# Patient Record
Sex: MALE | Race: WHITE | Employment: UNEMPLOYED | URBAN - METROPOLITAN AREA
[De-identification: names, ages, dates, MRNs, and addresses within clinical notes are randomized per-mention and may not be internally consistent; named-entity substitution may affect disease eponyms.]

---
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
## 2022-10-14 ENCOUNTER — TELEPHONE (OUTPATIENT)
Dept: FAMILY MEDICINE CLINIC | Age: 22
End: 2022-10-14

## 2022-10-14 NOTE — TELEPHONE ENCOUNTER
Received call from [facility] inquiring as to why the pt was turned away from his appt today. Judith Disla spoke with them and relayed the information that she gave to caregiver that accompanied the pt today that there was no r/s appt after the cancelled appt with Dr. Corrinne Seeds. Also advised that the  would be consulted with.

## 2022-10-31 ENCOUNTER — OFFICE VISIT (OUTPATIENT)
Dept: FAMILY MEDICINE CLINIC | Age: 22
End: 2022-10-31
Payer: MEDICAID

## 2022-10-31 VITALS
OXYGEN SATURATION: 95 % | TEMPERATURE: 98.1 F | DIASTOLIC BLOOD PRESSURE: 76 MMHG | RESPIRATION RATE: 20 BRPM | HEART RATE: 100 BPM | WEIGHT: 185.8 LBS | BODY MASS INDEX: 29.1 KG/M2 | SYSTOLIC BLOOD PRESSURE: 114 MMHG

## 2022-10-31 DIAGNOSIS — Z00.00 ENCOUNTER FOR MEDICAL EXAMINATION TO ESTABLISH CARE: ICD-10-CM

## 2022-10-31 DIAGNOSIS — F31.9 BIPOLAR 1 DISORDER (HCC): ICD-10-CM

## 2022-10-31 DIAGNOSIS — J30.1 SEASONAL ALLERGIC RHINITIS DUE TO POLLEN: ICD-10-CM

## 2022-10-31 DIAGNOSIS — J45.20 MILD INTERMITTENT ASTHMA WITHOUT COMPLICATION: Primary | ICD-10-CM

## 2022-10-31 DIAGNOSIS — L30.8 OTHER ECZEMA: ICD-10-CM

## 2022-10-31 DIAGNOSIS — F84.0 AUTISM SPECTRUM DISORDER: ICD-10-CM

## 2022-10-31 DIAGNOSIS — Z91.018 FOOD ALLERGY: ICD-10-CM

## 2022-10-31 DIAGNOSIS — F41.9 ANXIETY: ICD-10-CM

## 2022-10-31 DIAGNOSIS — F33.41 RECURRENT MAJOR DEPRESSIVE DISORDER, IN PARTIAL REMISSION (HCC): ICD-10-CM

## 2022-10-31 DIAGNOSIS — R42 LIGHTHEADED: ICD-10-CM

## 2022-10-31 DIAGNOSIS — F79 INTELLECTUAL DISABILITY: ICD-10-CM

## 2022-10-31 PROBLEM — L30.9 ECZEMA: Status: ACTIVE | Noted: 2022-10-31

## 2022-10-31 PROCEDURE — 99204 OFFICE O/P NEW MOD 45 MIN: CPT | Performed by: FAMILY MEDICINE

## 2022-10-31 RX ORDER — ALBUTEROL SULFATE 90 UG/1
AEROSOL, METERED RESPIRATORY (INHALATION)
COMMUNITY
Start: 2022-08-22 | End: 2022-10-31 | Stop reason: SDUPTHER

## 2022-10-31 RX ORDER — CHOLECALCIFEROL (VITAMIN D3) 125 MCG
CAPSULE ORAL
COMMUNITY
Start: 2022-10-13

## 2022-10-31 RX ORDER — EPINEPHRINE 0.3 MG/.3ML
0.3 INJECTION SUBCUTANEOUS
COMMUNITY

## 2022-10-31 RX ORDER — DIVALPROEX SODIUM 500 MG/1
500 TABLET, DELAYED RELEASE ORAL DAILY
COMMUNITY
Start: 2022-09-01

## 2022-10-31 RX ORDER — DOCUSATE SODIUM 100 MG/1
CAPSULE, LIQUID FILLED ORAL
COMMUNITY
Start: 2022-10-01

## 2022-10-31 RX ORDER — QUETIAPINE FUMARATE 400 MG/1
TABLET, FILM COATED ORAL
COMMUNITY
Start: 2022-10-01

## 2022-10-31 RX ORDER — DIVALPROEX SODIUM 500 MG/1
1000 TABLET, EXTENDED RELEASE ORAL
COMMUNITY
Start: 2022-10-01

## 2022-10-31 RX ORDER — CETIRIZINE HYDROCHLORIDE 10 MG/1
10 TABLET ORAL DAILY
Qty: 90 TABLET | Refills: 0 | Status: SHIPPED | OUTPATIENT
Start: 2022-10-31

## 2022-10-31 RX ORDER — TRIAMCINOLONE ACETONIDE 1 MG/G
CREAM TOPICAL
Qty: 15 G | Refills: 0 | Status: SHIPPED | OUTPATIENT
Start: 2022-10-31

## 2022-10-31 RX ORDER — ALBUTEROL SULFATE 90 UG/1
2 AEROSOL, METERED RESPIRATORY (INHALATION)
Qty: 2 EACH | Refills: 3 | Status: SHIPPED | OUTPATIENT
Start: 2022-10-31

## 2022-10-31 RX ORDER — MULTIVITAMIN
TABLET ORAL
COMMUNITY
Start: 2022-10-01

## 2022-10-31 NOTE — PROGRESS NOTES
Denny Sevilla (: 2000) is a 25 y.o. male, established patient, here for evaluation of the following chief complaint(s):  New Patient         ASSESSMENT/PLAN:  Below is the assessment and plan developed based on review of pertinent history, physical exam, labs, studies, and medications. 1. Mild intermittent asthma without complication  Well controlled, Continue albuterol PRN   -     ProAir HFA 90 mcg/actuation inhaler; Take 2 Puffs by inhalation every four (4) hours as needed for Wheezing., Normal, Disp-2 Each, R-3, MARI    2. Food allergy  Refer to allergy for testing   -     REFERRAL TO ALLERGY    3. Seasonal allergic rhinitis due to pollen  Continue zyrtec   -     cetirizine (ZYRTEC) 10 mg tablet; Take 1 Tablet by mouth daily. , Normal, Disp-90 Tablet, R-0    4. Other eczema  Add rx on hands PRN, do not use longer than 2 weeks  -     triamcinolone acetonide (KENALOG) 0.1 % topical cream; Apply  to affected area two (2) times daily as needed for Skin Irritation. use thin layer. Do not use for longer than 2 weeks at a time, Normal, Disp-15 g, R-0    5. Recurrent major depressive disorder, in partial remission ([address]  6. Bipolar 1 disorder ([address]  7. Anxiety  8. Autism spectrum disorder  9. Intellectual disability  Mood managed by psych   History of SI and HI, patient reports he is feeling well and denies any HI/SI today   -continue following with psych     10. Lightheaded  Only Occurred with extremely strenuous exercise last week, encouraged to gradually work up to jogging and running, start with walking   -if sensation occurs again return to office     11. Encounter for medical examination to establish care    Return in about 4 weeks (around 2022) for physical.      SUBJECTIVE/OBJECTIVE:  Chief Complaint   Patient presents with    New Patient     Patient is a 24 yo male who lives in a group home. [facility] with [facility]. He moved to new group [place] .    Patient has a past medical history  include MDD, bipolar, anxiety and high functioning autism, history of suicidal and homicidal ideations, mild ID, autism, mild intermittent asthma, eczema, allergic rhinitis. Psychiatrist is Dr. Jennifer Flynn [facility], [facility], [address]     [phone]   Patient is a longstanding history of homicidal and suicidal ideations with multiple admissions in the past as well as previous suicide attempt. Patient care report he is doing well currently, he denies any suicidal homicidal thoughts or ideations in the last several weeks. He is on a good medication regimen currently for him and psychiatry is checking his labs on Friday. Mild intermittent asthma, uses albuterol inhaler as needed when he goes on a long run. He has not needed this in several weeks. He denies any daily wheezing or gasping at night. No hospitalizations for asthma. He also has eczema when the seasons change on his hands. Also history of allergic rhinitis with season changes. He is not taking antihistamine. About 2 weeks ago patient's mom made a comment about him being overweight and he said he start exercising. He ran on the treadmill at a fast pace for several minutes and developed out of breath sensation and lightheadedness. EMTs were called and they said his vital signs were all stable and did not see any indication taking him to the emergency room. Since then patient reports he has been trying to jog a couple days a week and when he does he becomes out of breath and feel slightly lightheaded, this resolves soon as he sits down. He denies any palpitations, chest pain. No shortness of breath or lightheadedness at rest.  The room does not feel it is spinning. He does have some sneezing and rhinorrhea and occasional pressure in his ears. He has multiple food allergies and they would like to get him allergy tested. Review of Systems   Constitutional:  Negative for chills, fatigue and fever. HENT:  Negative for hearing loss. Eyes:  Negative for pain and visual disturbance. Respiratory:  Negative for cough, chest tightness and wheezing. Cardiovascular:  Negative for chest pain, palpitations and leg swelling. Gastrointestinal:  Negative for abdominal distention, abdominal pain, constipation, diarrhea, nausea and vomiting. Genitourinary:  Negative for dysuria, flank pain, frequency and hematuria. Musculoskeletal:  Negative for arthralgias, joint swelling and myalgias. Skin:  Negative for rash. Neurological:  Negative for dizziness, weakness and headaches. Psychiatric/Behavioral:  Negative for behavioral problems. The patient is not nervous/anxious. Current Outpatient Medications on File Prior to Visit   Medication Sig Dispense Refill    divalproex DR (DEPAKOTE) 500 mg tablet 500 mg daily. docusate sodium (COLACE) 100 mg capsule       One Daily Multivitamin tablet       EPINEPHrine (EPIPEN) 0.3 mg/0.3 mL injection 0.3 mg by IntraMUSCular route once as needed. melatonin 5 mg tablet       QUEtiapine (SEROquel) 400 mg tablet       divalproex ER (DEPAKOTE ER) 500 mg ER tablet Take 1,000 mg by mouth. At bedtime      [DISCONTINUED] ProAir HFA 90 mcg/actuation inhaler       [DISCONTINUED] OLANZapine (ZyPREXA) 15 mg tablet Take 15 mg by mouth nightly. (Patient not taking: Reported on 10/31/2022)      [DISCONTINUED] valproic acid (DEPAKENE) 250 mg capsule Take 500 mg by mouth two (2) times a day. (Patient not taking: Reported on 10/31/2022)      [DISCONTINUED] acetaminophen (TYLENOL) 325 mg tablet Take 650 mg by mouth every four (4) hours as needed for Pain. (Patient not taking: Reported on 10/31/2022)      [DISCONTINUED] alum-mag hydroxide-simeth (MYLANTA) 200-200-20 mg/5 mL susp Take 15 mL by mouth every four (4) hours as needed for Indigestion.  (Patient not taking: Reported on 10/31/2022)      [DISCONTINUED] benztropine (COGENTIN) 2 mg tablet Take 2 mg by mouth every two (2) hours as needed. (Patient not taking: Reported on 10/31/2022)      [DISCONTINUED] haloperidoL (HALDOL) 5 mg tablet Take 5 mg by mouth every four to six (4-6) hours as needed. (Patient not taking: Reported on 10/31/2022)      [DISCONTINUED] LORazepam (ATIVAN) 1 mg tablet Take 1 mg by mouth every four (4) hours as needed for Anxiety. (Patient not taking: Reported on 10/31/2022)      [DISCONTINUED] magnesium hydroxide (MILK OF MAGNESIA) 400 mg/5 mL suspension Take 30 mL by mouth daily as needed for Constipation. (Patient not taking: Reported on 10/31/2022)      [DISCONTINUED] melatonin 3 mg tablet Take 3 mg by mouth nightly. (Patient not taking: Reported on 10/31/2022)      [DISCONTINUED] nicotine (NICODERM CQ) 14 mg/24 hr patch 1 Patch by TransDERmal route every twenty-four (24) hours. (Patient not taking: Reported on 10/31/2022)       No current facility-administered medications on file prior to visit.      Patient Active Problem List    Diagnosis Date Noted    Intellectual disability 10/31/2022    Autism spectrum disorder 10/31/2022    Anxiety 10/31/2022    Bipolar 1 disorder ([facility] 10/31/2022    Recurrent major depressive disorder, in partial remission ([facility] 10/31/2022    Eczema 10/31/2022    Seasonal allergic rhinitis due to pollen 10/31/2022    Mild intermittent asthma without complication 32/19/8460    Food allergy 10/31/2022     Allergies   Allergen Reactions    Latex Rash    Avocado Anaphylaxis    Banana Anaphylaxis    Barley Anaphylaxis    Grass Pollen-Perennial Rye, Standard Anaphylaxis    Kiwi Anaphylaxis    Saw Anaphylaxis    Melon Anaphylaxis    Mustard Anaphylaxis    Peanut Anaphylaxis    Sesame Seed Anaphylaxis    Tree Nut Anaphylaxis    Flaxseed Angioedema    Risperidone Other (comments)     Neurological reaction    Sulfa (Sulfonamide Antibiotics) Unknown (comments)     Past Surgical History:   Procedure Laterality Date    HX OTHER SURGICAL      endoscopy when he was a child     Social History Tobacco Use    Smoking status: Never    Smokeless tobacco: Never   Vaping Use    Vaping Use: Never used   Substance Use Topics    Alcohol use: Never    Drug use: Never     History reviewed. No pertinent family history. Vitals:    10/31/22 0956   BP: 114/76   Pulse: 100   Resp: 20   Temp: 98.1 °F (36.7 °C)   TempSrc: Oral   SpO2: 95%   Weight: 185 lb 12.8 oz (84.3 kg)   PainSc:   0 - No pain        Physical Exam  Constitutional:       Appearance: Normal appearance. HENT:      Head: Normocephalic and atraumatic. Right Ear: Ear canal and external ear normal. A middle ear effusion is present. Left Ear: Ear canal and external ear normal. A middle ear effusion is present. Eyes:      Extraocular Movements: Extraocular movements intact. Pupils: Pupils are equal, round, and reactive to light. Neck:      Thyroid: No thyroid mass, thyromegaly or thyroid tenderness. Cardiovascular:      Rate and Rhythm: Normal rate and regular rhythm. Pulses: Normal pulses. Heart sounds: Normal heart sounds. Pulmonary:      Effort: Pulmonary effort is normal.      Breath sounds: Normal breath sounds. Abdominal:      General: Abdomen is flat. Bowel sounds are normal.      Palpations: Abdomen is soft. Musculoskeletal:      Cervical back: Normal range of motion. No rigidity. Right lower leg: No edema. Left lower leg: No edema. Lymphadenopathy:      Cervical: No cervical adenopathy. Skin:         Neurological:      General: No focal deficit present. Mental Status: He is alert and oriented to person, place, and time. Psychiatric:         Mood and Affect: Mood normal.         Behavior: Behavior normal.       An electronic signature was used to authenticate this note.   -- Tereza Hector PA-C

## 2022-11-01 ENCOUNTER — DOCUMENTATION ONLY (OUTPATIENT)
Dept: FAMILY MEDICINE CLINIC | Age: 22
End: 2022-11-01

## 2022-11-01 NOTE — PROGRESS NOTES
Adult & Geriatric Psychiatric records were put on KENYATTA LAZARO [facility] desk

## 2022-11-02 PROBLEM — F21 SCHIZOTYPAL PERSONALITY (HCC): Status: ACTIVE | Noted: 2022-11-02

## 2022-12-05 ENCOUNTER — DOCUMENTATION ONLY (OUTPATIENT)
Dept: FAMILY MEDICINE CLINIC | Age: 22
End: 2022-12-05

## 2022-12-05 ENCOUNTER — OFFICE VISIT (OUTPATIENT)
Dept: FAMILY MEDICINE CLINIC | Age: 22
End: 2022-12-05
Payer: MEDICAID

## 2022-12-05 ENCOUNTER — TELEPHONE (OUTPATIENT)
Dept: FAMILY MEDICINE CLINIC | Age: 22
End: 2022-12-05

## 2022-12-05 VITALS
DIASTOLIC BLOOD PRESSURE: 86 MMHG | OXYGEN SATURATION: 95 % | BODY MASS INDEX: 28.16 KG/M2 | SYSTOLIC BLOOD PRESSURE: 120 MMHG | HEIGHT: 67 IN | WEIGHT: 179.4 LBS | TEMPERATURE: 98.2 F | HEART RATE: 101 BPM | RESPIRATION RATE: 18 BRPM

## 2022-12-05 DIAGNOSIS — F84.0 AUTISM SPECTRUM DISORDER: ICD-10-CM

## 2022-12-05 DIAGNOSIS — F79 INTELLECTUAL DISABILITY: ICD-10-CM

## 2022-12-05 DIAGNOSIS — G44.219 EPISODIC TENSION-TYPE HEADACHE, NOT INTRACTABLE: ICD-10-CM

## 2022-12-05 DIAGNOSIS — F41.9 ANXIETY: ICD-10-CM

## 2022-12-05 DIAGNOSIS — Z00.00 ROUTINE GENERAL MEDICAL EXAMINATION AT HEALTH CARE FACILITY: Primary | ICD-10-CM

## 2022-12-05 DIAGNOSIS — J45.20 MILD INTERMITTENT ASTHMA WITHOUT COMPLICATION: ICD-10-CM

## 2022-12-05 DIAGNOSIS — Z11.1 SCREENING-PULMONARY TB: ICD-10-CM

## 2022-12-05 DIAGNOSIS — Z79.899 LONG TERM CURRENT USE OF ANTIPSYCHOTIC MEDICATION: ICD-10-CM

## 2022-12-05 DIAGNOSIS — E03.9 ACQUIRED HYPOTHYROIDISM: ICD-10-CM

## 2022-12-05 DIAGNOSIS — F31.9 BIPOLAR 1 DISORDER (HCC): ICD-10-CM

## 2022-12-05 DIAGNOSIS — L30.8 OTHER ECZEMA: ICD-10-CM

## 2022-12-05 RX ORDER — TRIAMCINOLONE ACETONIDE 1 MG/G
OINTMENT TOPICAL 2 TIMES DAILY
Qty: 30 G | Refills: 0 | Status: SHIPPED | OUTPATIENT
Start: 2022-12-05

## 2022-12-05 RX ORDER — BACITRACIN ZINC 500 UNIT/G
OINTMENT (GRAM) TOPICAL
Qty: 15 G | Refills: 0 | Status: SHIPPED | OUTPATIENT
Start: 2022-12-05

## 2022-12-05 RX ORDER — ACETAMINOPHEN 500 MG
500 TABLET ORAL
Qty: 30 TABLET | Refills: 1 | Status: SHIPPED | OUTPATIENT
Start: 2022-12-05

## 2022-12-05 NOTE — PROGRESS NOTES
Darlene Reno (: 2000) is a 25 y.o. male, established patient, here for evaluation of the following chief complaint(s):  Physical, Labs (Fasting today. ), Request For New Medication (Request prn tylenol or ibuprofen for headaches. ), and Dry Skin (On (L) hand, peeling and very dry. /Occurs whenever it gets cold outside. )         ASSESSMENT/PLAN:  Below is the assessment and plan developed based on review of pertinent history, physical exam, labs, studies, and medications. 1. Routine general medical examination at health care facility  2. Long term current use of antipsychotic medication  3. Bipolar 1 disorder ([facility]  4. Anxiety  5. Autism spectrum disorder  6. Intellectual disability  Mood is managed by psych but he needs updated labs for monitoring   -     CBC WITH AUTOMATED DIFF; Future  -     METABOLIC PANEL, COMPREHENSIVE; Future  -     LIPID PANEL; Future  -     TSH 3RD GENERATION; Future  -     VALPROIC ACID; Future    7. Screening-pulmonary TB  No sx, screening   -     QUANTIFERON-TB GOLD PLUS    8. Episodic tension-type headache, not intractable  Recommend avoidance of arguments in group home   Tylenol PRN for headache relief   -     acetaminophen (TYLENOL) 500 mg tablet; Take 1 Tablet by mouth every six (6) hours as needed for Pain or Fever., Normal, Disp-30 Tablet, R-1    9. Other eczema  Poorly controlled on L hand with evidence of superficial bacterial infection on pointer finger   Extensive conversation with patient and caregiver today about proper care for eczema. Avoid hot water and harsh scrubbing, moisturize with thick, unscented moisturizer. Keep areas clean  -add bacitracin for area on L pointer finger and L pinky that have cracking of skin   -add kenalog ointment to other areas   -discussed risks of untreated eczema, skin cracking, secondary bacterial infections and they voiced understanding   -     triamcinolone acetonide (KENALOG) 0.1 % ointment;  Apply  to affected area two (2) times a day. use thin layer. Do not apply to cracked skin. Do no use for longer than 2 weeks, Normal, Disp-30 g, R-0  -     bacitracin zinc (BACITRACIN) ointment; Apply to affected areas of cracked skin twice per day for 1 week., Normal, Disp-15 g, R-0    10. Mild intermittent asthma without complication  Well controlled with albuterol PRN     Follow up 2 weeks for eczema       SUBJECTIVE/OBJECTIVE:  Chief Complaint   Patient presents with    Physical    Labs     Fasting today. Request For New Medication     Request prn tylenol or ibuprofen for headaches. Dry Skin     On (L) hand, peeling and very dry. Occurs whenever it gets cold outside. Patient is a 61-year-old male with eczema, mild intermittent asthma, intellectual disability, autism spectrum disorder, bipolar 1 disorder, schizotypal personality presenting office for physical.  He is with his group home caregiver. Psychiatric conditions are managed by psychiatry but they are not checking labs. Caregiver reports they may in the future but patient just established with them and they are unsure when last time lab work has been checked. For acute concerns, patient reports intermittent headaches, these occur 1 or 2 days/week when he is stressed. Reports he is overwhelmed in the group home when people start getting in arguments and will feel headache on bilateral temples it is aching in nature and resolves when he removes himself from the situation. They are requesting Tylenol to have prn for pain if needed. He denies any vision changes, facial droop or weakness, dysarthria, sensory changes in the extremities or weakness in his extremities. For eczema, reports it is worsening on his left hand, always worse in the cold. They are not using the steroid cream that was prescribed at the last visit. He has 2 areas on his left hand that are started cracking. Otherwise patient feels well.   No chest pains, lightheadedness, shortness of breath, tachycardia. No lower extremity swelling. No abdominal pain, diarrhea or constipation. No fevers or chills. Current Outpatient Medications on File Prior to Visit   Medication Sig Dispense Refill    divalproex DR (DEPAKOTE) 500 mg tablet 500 mg daily. docusate sodium (COLACE) 100 mg capsule       One Daily Multivitamin tablet       melatonin 5 mg tablet       QUEtiapine (SEROquel) 400 mg tablet       divalproex ER (DEPAKOTE ER) 500 mg ER tablet Take 1,000 mg by mouth. At bedtime      ProAir HFA 90 mcg/actuation inhaler Take 2 Puffs by inhalation every four (4) hours as needed for Wheezing. 2 Each 3    cetirizine (ZYRTEC) 10 mg tablet Take 1 Tablet by mouth daily. 90 Tablet 0    EPINEPHrine (EPIPEN) 0.3 mg/0.3 mL injection 0.3 mg by IntraMUSCular route once as needed. [DISCONTINUED] triamcinolone acetonide (KENALOG) 0.1 % topical cream Apply  to affected area two (2) times daily as needed for Skin Irritation. use thin layer. Do not use for longer than 2 weeks at a time 15 g 0     No current facility-administered medications on file prior to visit.      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[facility] 11/02/2022    Intellectual disability 10/31/2022    Autism spectrum disorder 10/31/2022    Anxiety 10/31/2022    Bipolar 1 disorder ([address] 10/31/2022    Recurrent major depressive disorder, in partial remission ([facility] 10/31/2022    Eczema 10/31/2022    Seasonal allergic rhinitis due to pollen 10/31/2022    Mild intermittent asthma without complication 35/56/6517    Food allergy 10/31/2022     Allergies   Allergen Reactions    Latex Rash    Avocado Anaphylaxis    Banana Anaphylaxis    Barley Anaphylaxis    Grass Pollen-Perennial Rye, Standard Anaphylaxis    Kiwi Anaphylaxis    Saw Anaphylaxis    Melon Anaphylaxis    Mustard Anaphylaxis    Peanut Anaphylaxis    Sesame Seed Anaphylaxis    Tree Nut Anaphylaxis    Flaxseed Angioedema Risperidone Other (comments)     Neurological reaction    Sulfa (Sulfonamide Antibiotics) Unknown (comments)     Past Surgical History:   Procedure Laterality Date    HX OTHER SURGICAL      endoscopy when he was a child     Social History     Tobacco Use    Smoking status: Never    Smokeless tobacco: Never   Vaping Use    Vaping Use: Never used   Substance Use Topics    Alcohol use: Never    Drug use: Never     No family history on file. Vitals:    12/05/22 1021   BP: 120/86   Pulse: (!) 101   Resp: 18   Temp: 98.2 °F (36.8 °C)   TempSrc: Oral   SpO2: 95%   Weight: 179 lb 6.4 oz (81.4 kg)   Height: 5' 7\" (1.702 m)        Physical Exam  Constitutional:       Appearance: Normal appearance. HENT:      Head: Normocephalic and atraumatic. Right Ear: Tympanic membrane, ear canal and external ear normal.      Left Ear: Tympanic membrane, ear canal and external ear normal.      Nose: Nose normal. No congestion or rhinorrhea. Mouth/Throat:      Pharynx: No oropharyngeal exudate or posterior oropharyngeal erythema. Eyes:      Extraocular Movements: Extraocular movements intact. Pupils: Pupils are equal, round, and reactive to light. Cardiovascular:      Rate and Rhythm: Normal rate and regular rhythm. Pulses: Normal pulses. Heart sounds: Normal heart sounds. Pulmonary:      Effort: Pulmonary effort is normal.      Breath sounds: Normal breath sounds. Abdominal:      General: Abdomen is flat. Bowel sounds are normal.      Palpations: Abdomen is soft. Musculoskeletal:      Cervical back: Normal range of motion and neck supple. No rigidity. Right lower leg: No edema. Left lower leg: No edema. Lymphadenopathy:      Cervical: No cervical adenopathy. Skin:     General: Skin is warm. Neurological:      General: No focal deficit present. Mental Status: He is alert and oriented to person, place, and time.    Psychiatric:         Mood and Affect: Mood normal. Behavior: Behavior normal.           An electronic signature was used to authenticate this note.   -- Fabrizio Fair PA-C

## 2022-12-05 NOTE — TELEPHONE ENCOUNTER
Spoke with Jordana Gamble from group home. She verified pt's full name and birthdate. Relayed the previous message per Mary Baxter PA-C. She verbalized understanding.

## 2022-12-05 NOTE — PROGRESS NOTES
Keysha Randall is a 25 y.o. male , id x 2(name and ). Reviewed record, history, and  medications. Chief Complaint   Patient presents with    Physical    Labs     Fasting today. Request For New Medication     Request prn tylenol or ibuprofen for headaches. Dry Skin     On (L) hand, peeling and very dry. Occurs whenever it gets cold outside. Vitals:    22 1021   BP: 120/86   Pulse: (!) 101   Resp: 18   Temp: 98.2 °F (36.8 °C)   TempSrc: Oral   SpO2: 95%   Weight: 179 lb 6.4 oz (81.4 kg)   Height: 5' 7\" (1.702 m)       Coordination of Care Questionnaire:   1. Have you been to the ER, urgent care clinic since your last visit? Hospitalized since your last visit? No    2. Have you seen or consulted any other health care providers outside of the [address] [place] [address] since your last visit? Include any pap smears or colon screening.  No

## 2022-12-05 NOTE — TELEPHONE ENCOUNTER
Loretta Naranjo/ [facility] would like to discuss why a Medicare Wellness wasn't done today. She stated the AVS there was nothing stated that a physical  was done.  Ms Naranjo's phone: [phone]

## 2022-12-05 NOTE — PROGRESS NOTES
Patient will be having an in home visitation soon per caregiver. Patient was to have hand rechecked in two weeks. Caregiver is going to check with supervisor to see about scheduling recheck, not sure if patient will be at the group home.

## 2022-12-05 NOTE — TELEPHONE ENCOUNTER
Attempt to return call. Unsuccessful. Left message to call the office back. Re: Per Brenton Madsen PA-C: Medicaid is listed in the chart, we can't bill for Medicare Wellness visit with Medicaid. If he has Medicare we do not have that card on file in his chart and would need to schedule another appt. Concerns were addressed and a physical was done but could not be billed as Medicare Wellness. Will fill out the forms once the labs come back.

## 2022-12-08 ENCOUNTER — DOCUMENTATION ONLY (OUTPATIENT)
Dept: FAMILY MEDICINE CLINIC | Age: 22
End: 2022-12-08

## 2022-12-08 LAB
ALBUMIN SERPL-MCNC: 5.1 G/DL (ref 4.1–5.2)
ALBUMIN/GLOB SERPL: 2 {RATIO} (ref 1.2–2.2)
ALP SERPL-CCNC: 110 IU/L (ref 44–121)
ALT SERPL-CCNC: 129 IU/L (ref 0–44)
AST SERPL-CCNC: 106 IU/L (ref 0–40)
BASOPHILS # BLD AUTO: 0 X10E3/UL (ref 0–0.2)
BASOPHILS NFR BLD AUTO: 1 %
BILIRUB SERPL-MCNC: 0.6 MG/DL (ref 0–1.2)
BUN SERPL-MCNC: 2 MG/DL (ref 6–20)
BUN/CREAT SERPL: 3 (ref 9–20)
CALCIUM SERPL-MCNC: 9.6 MG/DL (ref 8.7–10.2)
CHLORIDE SERPL-SCNC: 99 MMOL/L (ref 96–106)
CHOLEST SERPL-MCNC: 294 MG/DL (ref 100–199)
CO2 SERPL-SCNC: 24 MMOL/L (ref 20–29)
CREAT SERPL-MCNC: 0.8 MG/DL (ref 0.76–1.27)
EGFR: 129 ML/MIN/1.73
EOSINOPHIL # BLD AUTO: 0.2 X10E3/UL (ref 0–0.4)
EOSINOPHIL NFR BLD AUTO: 4 %
ERYTHROCYTE [DISTWIDTH] IN BLOOD BY AUTOMATED COUNT: 14.3 % (ref 11.6–15.4)
GAMMA INTERFERON BACKGROUND BLD IA-ACNC: 0.01 IU/ML
GLOBULIN SER CALC-MCNC: 2.5 G/DL (ref 1.5–4.5)
GLUCOSE SERPL-MCNC: 89 MG/DL (ref 70–99)
HCT VFR BLD AUTO: 48.9 % (ref 37.5–51)
HDLC SERPL-MCNC: 29 MG/DL
HGB BLD-MCNC: 16.7 G/DL (ref 13–17.7)
IMM GRANULOCYTES # BLD AUTO: 0 X10E3/UL (ref 0–0.1)
IMM GRANULOCYTES NFR BLD AUTO: 1 %
IMP & REVIEW OF LAB RESULTS: NORMAL
LDLC SERPL CALC-MCNC: 175 MG/DL (ref 0–99)
LYMPHOCYTES # BLD AUTO: 1.9 X10E3/UL (ref 0.7–3.1)
LYMPHOCYTES NFR BLD AUTO: 42 %
M TB IFN-G BLD-IMP: NEGATIVE
M TB IFN-G CD4+ BCKGRND COR BLD-ACNC: 0.02 IU/ML
MCH RBC QN AUTO: 31.7 PG (ref 26.6–33)
MCHC RBC AUTO-ENTMCNC: 34.2 G/DL (ref 31.5–35.7)
MCV RBC AUTO: 93 FL (ref 79–97)
MITOGEN IGNF BLD-ACNC: >10 IU/ML
MONOCYTES # BLD AUTO: 0.7 X10E3/UL (ref 0.1–0.9)
MONOCYTES NFR BLD AUTO: 16 %
NEUTROPHILS # BLD AUTO: 1.6 X10E3/UL (ref 1.4–7)
NEUTROPHILS NFR BLD AUTO: 36 %
NRBC BLD AUTO-RTO: 1 % (ref 0–0)
PLATELET # BLD AUTO: 156 X10E3/UL (ref 150–450)
POTASSIUM SERPL-SCNC: 3.6 MMOL/L (ref 3.5–5.2)
PROT SERPL-MCNC: 7.6 G/DL (ref 6–8.5)
QUANTIFERON INCUBATION, QF1T: NORMAL
QUANTIFERON TB2 AG: 0.02 IU/ML
RBC # BLD AUTO: 5.26 X10E6/UL (ref 4.14–5.8)
SERVICE CMNT-IMP: NORMAL
SODIUM SERPL-SCNC: 142 MMOL/L (ref 134–144)
TRIGL SERPL-MCNC: 448 MG/DL (ref 0–149)
TSH SERPL DL<=0.005 MIU/L-ACNC: 17.9 UIU/ML (ref 0.45–4.5)
VALPROATE SERPL-MCNC: 121 UG/ML (ref 50–100)
VLDLC SERPL CALC-MCNC: 90 MG/DL (ref 5–40)
WBC # BLD AUTO: 4.6 X10E3/UL (ref 3.4–10.8)

## 2022-12-08 RX ORDER — LEVOTHYROXINE SODIUM 25 UG/1
25 TABLET ORAL
Qty: 30 TABLET | Refills: 1 | Status: SHIPPED | OUTPATIENT
Start: 2022-12-08

## 2022-12-08 NOTE — PROGRESS NOTES
I spoke with Dr. Gemma Lizama psychiatrist as well as Carly Feliz who is patient caregiver. Per recommendation of Dr. Gemma Lizama, reduce depakote to 500mg BID due to depakote level high at 121. Recheck LFTs on Monday, possible depakote induced hepatotoxicity causing elevated LFTs. TSH is also high, will start synthroid 25mcg and add on T3 and free T4. Patient may have been on lithium in the past, possible lithium thyrotoxicity.    CBC normal.  Quantiferon gold TB screening negative   Remainder of cmp normal   Cholesterol is very high, will address further on Monday at follow up to recheck LFTs, appt scheduled for 12/12/2022 at [place] [address] and caregiver aware     Danielle Ty LPN can you fax labs to Dr. Gabino Royal office [phone]     For future reference, Dr. Gemma Lizama office number is [phone] and cell number is [phone]

## 2022-12-08 NOTE — PROGRESS NOTES
Faxed add-on request for T3 Total and Free T4 to [facility] (fax# [phone]). Confirmation received.

## 2022-12-11 LAB
SPECIMEN STATUS REPORT, ROLRST: NORMAL
T3 SERPL-MCNC: 129 NG/DL (ref 71–180)
T4 FREE SERPL-MCNC: 1.15 NG/DL (ref 0.82–1.77)

## 2022-12-12 ENCOUNTER — TELEPHONE (OUTPATIENT)
Dept: FAMILY MEDICINE CLINIC | Age: 22
End: 2022-12-12

## 2022-12-12 NOTE — TELEPHONE ENCOUNTER
Yes its at the  for pickup, they canceled his appt today. .. we need to recheck his labs this week.  Please reschedule for later this week

## 2022-12-12 NOTE — TELEPHONE ENCOUNTER
Ana/JUANIS & FATOUMATA Meléndez would like to speak with nurse to see if Claus Chao is completed.  Ana's phone: [phone]

## 2022-12-12 NOTE — TELEPHONE ENCOUNTER
----- Message from Clayton Currie PA-C sent at 12/12/2022  9:55 AM EST -----  Please call caregivers and advise  -T4 and T3 which are further thyroid function labs returned normal. Labs indicate subclinical hypothyroidism. With how high tsh was I recommend continuing the synthroid 25mcg and rechecking in 4 to 6 weeks. Plan reviewed with Dr. Ronal Ashby.

## 2022-12-12 NOTE — TELEPHONE ENCOUNTER
Returned call to caregiver Luis Fernando Villarreal. She verified pt's full name and birthdate. Relayed the previous messages per Cynthia Ly PA-C. She verbalized understanding. Appt scheduled to recheck labs. She requested the results be printed so she can have the information in writing. Informed her will attach it to the physical form and leave it at the . She verbalized understanding and stated she will pick it up at his appt on Friday.

## 2022-12-12 NOTE — PROGRESS NOTES
Please call caregivers and advise  -T4 and T3 which are further thyroid function labs returned normal. Labs indicate subclinical hypothyroidism. With how high tsh was I recommend continuing the synthroid 25mcg and rechecking in 4 to 6 weeks. Plan reviewed with Dr. Yael Amador.

## 2022-12-16 ENCOUNTER — OFFICE VISIT (OUTPATIENT)
Dept: FAMILY MEDICINE CLINIC | Age: 22
End: 2022-12-16
Payer: MEDICAID

## 2022-12-16 VITALS
HEART RATE: 102 BPM | DIASTOLIC BLOOD PRESSURE: 79 MMHG | WEIGHT: 177.7 LBS | TEMPERATURE: 98.2 F | OXYGEN SATURATION: 95 % | RESPIRATION RATE: 18 BRPM | SYSTOLIC BLOOD PRESSURE: 122 MMHG | BODY MASS INDEX: 27.89 KG/M2 | HEIGHT: 67 IN

## 2022-12-16 DIAGNOSIS — E78.2 MIXED HYPERLIPIDEMIA: ICD-10-CM

## 2022-12-16 DIAGNOSIS — R74.8 ELEVATED LIVER ENZYMES: Primary | ICD-10-CM

## 2022-12-16 DIAGNOSIS — R79.89 ELEVATED TSH: ICD-10-CM

## 2022-12-16 DIAGNOSIS — L30.9 ECZEMA, UNSPECIFIED TYPE: ICD-10-CM

## 2022-12-16 RX ORDER — IBUPROFEN 200 MG
200 TABLET ORAL
Qty: 30 TABLET | Refills: 0 | Status: SHIPPED | OUTPATIENT
Start: 2022-12-16

## 2022-12-16 NOTE — PROGRESS NOTES
Ovidio Tran is a 25 y.o. male , id x 2(name and ). Reviewed record, history, and  medications. Chief Complaint   Patient presents with    Labs       Vitals:    22 0948   BP: 122/79   Pulse: (!) 102   Resp: 18   Temp: 98.2 °F (36.8 °C)   TempSrc: Oral   SpO2: 95%   Weight: 177 lb 11.2 oz (80.6 kg)   Height: 5' 7\" (1.702 m)       Coordination of Care Questionnaire:   1. Have you been to the ER, urgent care clinic since your last visit? Hospitalized since your last visit? No    2. Have you seen or consulted any other health care providers outside of the [address] [place] [address] since your last visit? Include any pap smears or colon screening.  No

## 2022-12-16 NOTE — PROGRESS NOTES
Marina Garber (: 2000) is a 25 y.o. male, established patient, here for evaluation of the following chief complaint(s):  Labs         ASSESSMENT/PLAN:  Below is the assessment and plan developed based on review of pertinent history, physical exam, labs, studies, and medications. 1. Elevated liver enzymes  Possibly secondary to depakote, dose has been reduced and is being monitored by Dr. Yue Crawford psychiatrist.   -depakote dose has been reduced x 1 week  -no liver enlargement, tenderness or sx of hepatic failure on exam   -recheck LFTs and additional labs as below   -d/c tylenol PRN and add ibuprofen PRN instead   -     HEPATIC FUNCTION PANEL; Future  -     HEP B SURFACE AG; Future  -     HEP B SURFACE AB; Future  -     HBV CORE AB, IGG/IGM; Future  -     HEPATITIS C AB; Future  -     IRON PROFILE; Future  -     FERRITIN; Future    2. Eczema, unspecified type  Improvement with steroid ointment, continue PRN. Do not use for longer than 2 weeks    3. Lives in group home  Form filled out for group home     4. Mixed hyperlipidemia  Lab Results   Component Value Date/Time    Cholesterol, total 294 (H) 2022 12:00 AM    HDL Cholesterol 29 (L) 2022 12:00 AM    LDL, calculated 175 (H) 2022 12:00 AM    VLDL, calculated 90 (H) 2022 12:00 AM    Triglyceride 448 (H) 2022 12:00 AM     -extensive discussion regarding dietary changes and importance of regular exercise. Hold off on medications for now while evaluating above concerns   -recheck in 3 mo     5. Elevated tsh   Lab Results   Component Value Date/Time    TSH 17.900 (H) 2022 12:00 AM   With normal T3 and T4, subclinical hypothyroidism. He is tolerating synthroid well. Continue for 1 mo and recheck tsh     Return in about 4 weeks (around 2023) for tsh check.       SUBJECTIVE/OBJECTIVE:  Chief Complaint   Patient presents with    Labs     Patient is a 79-year-old male with intellectual disability, anxiety, bipolar 1 disorder, eczema, lives in group home to recheck liver function test.    11 days ago patient was in office for routine medical examination and labs were checked. He just established care with Dr. Eugenia Tinoco and Depakote level need to be drawn as well. Labs show TSH elevated at 17.9 and Synthroid 25 mg was started. Subsequent T3 and T4 were within normal range. It is possible he was on lithium in the past, psychiatrist Dr. Eugenia Tinoco thinks may be he has lithium induced thyroid toxicity?  -He has been tolerating Synthroid well, they have not noticed any changes in behavior. Patient reports has been feeling well, no diarrhea or abdominal pain, no palpitations or shortness of breath or vision changes. Labs also show the Depakote level was high as were LFTs. I consulted with Dr. Eugenia Tinoco psychiatrist who recommended decreasing Depakote dose to 500 mg twice daily rather than 500 a.m. and thousand at night. Group home caregiver confirms this has been the dosing for the last week. Patient denies any abdominal pain, jaundice to the skin. Lower extremity swelling. Diarrhea or constipation, nausea or vomiting. No itching. Eczema significant proved on his hands with bacitracin on split areas of skin and steroid ointment on the rest of his hands. He has been try to lotion well and use lukewarm water and there is been significant improvement. Review of systems negative other than mentioned in HPI    Current Outpatient Medications on File Prior to Visit   Medication Sig Dispense Refill    levothyroxine (SYNTHROID) 25 mcg tablet Take 1 Tablet by mouth Daily (before breakfast). 30 Tablet 1    triamcinolone acetonide (KENALOG) 0.1 % ointment Apply  to affected area two (2) times a day. use thin layer. Do not apply to cracked skin. Do no use for longer than 2 weeks 30 g 0    bacitracin zinc (BACITRACIN) ointment Apply to affected areas of cracked skin twice per day for 1 week.  15 g 0    divalproex DR (DEPAKOTE) 500 mg tablet 500 mg daily. docusate sodium (COLACE) 100 mg capsule       One Daily Multivitamin tablet       melatonin 5 mg tablet       QUEtiapine (SEROquel) 400 mg tablet       divalproex ER (DEPAKOTE ER) 500 mg ER tablet Take 1,000 mg by mouth. At bedtime      ProAir HFA 90 mcg/actuation inhaler Take 2 Puffs by inhalation every four (4) hours as needed for Wheezing. 2 Each 3    cetirizine (ZYRTEC) 10 mg tablet Take 1 Tablet by mouth daily. 90 Tablet 0    [DISCONTINUED] acetaminophen (TYLENOL) 500 mg tablet Take 1 Tablet by mouth every six (6) hours as needed for Pain or Fever. 30 Tablet 1    EPINEPHrine (EPIPEN) 0.3 mg/0.3 mL injection 0.3 mg by IntraMUSCular route once as needed. No current facility-administered medications on file prior to visit.      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[facility] [address] 11/02/2022    Intellectual disability 10/31/2022    Autism spectrum disorder 10/31/2022    Anxiety 10/31/2022    Bipolar 1 disorder ([facility] 10/31/2022    Recurrent major depressive disorder, in partial remission ([facility] [address] 10/31/2022    Eczema 10/31/2022    Seasonal allergic rhinitis due to pollen 10/31/2022    Mild intermittent asthma without complication 00/56/4618    Food allergy 10/31/2022     Allergies   Allergen Reactions    Latex Rash    Avocado Anaphylaxis    Banana Anaphylaxis    Barley Anaphylaxis    Grass Pollen-Perennial Rye, Standard Anaphylaxis    Kiwi Anaphylaxis    Saw Anaphylaxis    Melon Anaphylaxis    Mustard Anaphylaxis    Peanut Anaphylaxis    Sesame Seed Anaphylaxis    Tree Nut Anaphylaxis    Flaxseed Angioedema    Risperidone Other (comments)     Neurological reaction    Sulfa (Sulfonamide Antibiotics) Unknown (comments)     Past Surgical History:   Procedure Laterality Date    HX OTHER SURGICAL      endoscopy when he was a child     Social History     Tobacco Use    Smoking status: Never    Smokeless tobacco: Never   Vaping Use    Vaping Use: Never used   Substance Use Topics    Alcohol use: Never    Drug use: Never     Vitals:    12/16/22 0948   BP: 122/79   Pulse: (!) 102   Resp: 18   Temp: 98.2 °F (36.8 °C)   TempSrc: Oral   SpO2: 95%   Weight: 177 lb 11.2 oz (80.6 kg)   Height: 5' 7\" (1.702 m)   PainSc:   0 - No pain        Physical Exam  Constitutional:       Appearance: Normal appearance. Eyes:      General: No scleral icterus. Extraocular Movements: Extraocular movements intact. Pupils: Pupils are equal, round, and reactive to light. Cardiovascular:      Rate and Rhythm: Normal rate and regular rhythm. Pulses: Normal pulses. Heart sounds: Normal heart sounds. Pulmonary:      Effort: Pulmonary effort is normal.      Breath sounds: Normal breath sounds. Abdominal:      General: Abdomen is flat. Bowel sounds are normal. There is no distension. Palpations: Abdomen is soft. There is no mass. Tenderness: There is no abdominal tenderness (He does report mild right lower quadrant discomfort but not pain). There is no right CVA tenderness, left CVA tenderness, guarding or rebound. Hernia: No hernia is present. Musculoskeletal:      Cervical back: Normal range of motion. No rigidity. Right lower leg: No edema. Left lower leg: No edema. Lymphadenopathy:      Cervical: No cervical adenopathy. Skin:     Capillary Refill: Capillary refill takes less than 2 seconds. Comments: Skin does not appear jaundiced    Eczema on his bilateral hands significantly improved, there is no more splitting of the skin on his left hand. No erythema or purulent drainage. Neurological:      General: No focal deficit present. Mental Status: He is alert and oriented to person, place, and time. Psychiatric:         Mood and Affect: Mood normal.         Behavior: Behavior normal.       An electronic signature was used to authenticate this note.   -- Brittni Toribio PA-C

## 2022-12-17 LAB
ALBUMIN SERPL-MCNC: 4.4 G/DL (ref 4.1–5.2)
ALP SERPL-CCNC: 88 IU/L (ref 44–121)
ALT SERPL-CCNC: 69 IU/L (ref 0–44)
AST SERPL-CCNC: 65 IU/L (ref 0–40)
BILIRUB DIRECT SERPL-MCNC: 0.13 MG/DL (ref 0–0.4)
BILIRUB SERPL-MCNC: 0.4 MG/DL (ref 0–1.2)
FERRITIN SERPL-MCNC: 237 NG/ML (ref 30–400)
HBV CORE AB SERPL QL IA: NEGATIVE
HBV CORE IGM SERPL QL IA: NEGATIVE
HBV SURFACE AB SER QL: NON REACTIVE
HBV SURFACE AG SERPL QL IA: NEGATIVE
HCV AB S/CO SERPL IA: 0.1 S/CO RATIO (ref 0–0.9)
IRON SATN MFR SERPL: 44 % (ref 15–55)
IRON SERPL-MCNC: 131 UG/DL (ref 38–169)
PROT SERPL-MCNC: 6.6 G/DL (ref 6–8.5)
TIBC SERPL-MCNC: 299 UG/DL (ref 250–450)
UIBC SERPL-MCNC: 168 UG/DL (ref 111–343)

## 2022-12-18 NOTE — PROGRESS NOTES
Please call caregivers and advise  -labs show liver function has improved compared to last week. This likely indicates reducing the depakote dose is having a positive impact on his liver as the levels are decreasing. His iron levels are normal and the hepatitis testing is negative. The hepatitis testing does show he does NOT have immunity to hepatitis B. We do not have his immunization history but based on this he may never had received the hepatitis B vaccine. I do recommend after his liver function has returned to normal he receive the hepatitis B vaccine (although we cannot give this to him due to his insurance, they would go through health department).      -follow up in 1 month for recheck of LFTs and TSH     -Nikki Stevenson LPN please fax labs and office note to Dr. Farooq Paige psychiatry [phone]    Gregoria Melgar PA-C

## 2022-12-28 NOTE — PROGRESS NOTES
Called pt's caregiver Severance ([phone]). She states she's currently out of the office and will not be back at the group home until the night shift. She states to contact the group home ([phone]) and inform the day caregiver Ana. Called group home ([phone]). Spoke with caregiver Ana. She verified pt's full name and birthdate. Relayed the previous message per Sparkle Mary PA-C. She verbalized understanding. She requested the results be mailed to the address on file so they can have the information in writing. Informed her will mail the results to the address on file as requested. She requested follow up appt to be scheduled on 1/30/23. Appt scheduled.

## 2023-01-11 DIAGNOSIS — J30.1 SEASONAL ALLERGIC RHINITIS DUE TO POLLEN: ICD-10-CM

## 2023-01-11 RX ORDER — CETIRIZINE HYDROCHLORIDE 10 MG/1
10 TABLET ORAL DAILY
Qty: 31 TABLET | Refills: 11 | Status: SHIPPED | OUTPATIENT
Start: 2023-01-11

## 2023-01-30 ENCOUNTER — OFFICE VISIT (OUTPATIENT)
Dept: FAMILY MEDICINE CLINIC | Age: 23
End: 2023-01-30
Payer: MEDICAID

## 2023-01-30 VITALS
HEART RATE: 96 BPM | TEMPERATURE: 97.6 F | HEIGHT: 67 IN | WEIGHT: 183.6 LBS | DIASTOLIC BLOOD PRESSURE: 75 MMHG | BODY MASS INDEX: 28.82 KG/M2 | SYSTOLIC BLOOD PRESSURE: 114 MMHG | RESPIRATION RATE: 18 BRPM | OXYGEN SATURATION: 95 %

## 2023-01-30 DIAGNOSIS — R79.89 ELEVATED LFTS: ICD-10-CM

## 2023-01-30 DIAGNOSIS — E78.1 HYPERTRIGLYCERIDEMIA: ICD-10-CM

## 2023-01-30 DIAGNOSIS — E03.9 ACQUIRED HYPOTHYROIDISM: Primary | ICD-10-CM

## 2023-01-30 DIAGNOSIS — E78.2 MIXED HYPERLIPIDEMIA: ICD-10-CM

## 2023-01-30 PROCEDURE — 99214 OFFICE O/P EST MOD 30 MIN: CPT | Performed by: FAMILY MEDICINE

## 2023-01-30 NOTE — PROGRESS NOTES
Isabel Lim is a 26 y.o. male , id x 2(name and ). Reviewed record, history, and  medications. Chief Complaint   Patient presents with    Follow-up    Labs       Vitals:    23 1126   BP: 114/75   Pulse: 96   Resp: 18   Temp: 97.6 °F (36.4 °C)   TempSrc: Oral   SpO2: 95%   Weight: 183 lb 9.6 oz (83.3 kg)   Height: 5' 7\" (1.702 m)       Coordination of Care Questionnaire:   1. Have you been to the ER, urgent care clinic since your last visit? Hospitalized since your last visit? No    2. Have you seen or consulted any other health care providers outside of the [address] [place] [address] since your last visit? Include any pap smears or colon screening.  No

## 2023-01-30 NOTE — PROGRESS NOTES
Yanna Baker (: 2000) is a 26 y.o. male, established patient, here for evaluation of the following chief complaint(s):  Follow-up and Labs         ASSESSMENT/PLAN:  Below is the assessment and plan developed based on review of pertinent history, physical exam, labs, studies, and medications. 1. Acquired hypothyroidism  Lab Results   Component Value Date/Time    TSH 17.900 (H) 2022 12:00 AM     Tolerating synthroid well. Psych suggested Possible lithium thyrotoxicity as he likely has been on lithium in the past   -recheck labs today   -     TSH 3RD GENERATION; Future  -     T4, FREE; Future    2. Elevated LFTs  Possible secondary to prolonged overmedication with depakote   Depakote dose has been reduced and they are following with psych Dr. Rosanna Nair for rechecks of depakote   Previous hepatitis testing and iron levels last month were normal   -recheck labs today   Will fax results to Dr. Rosanna Nair [phone]   -     METABOLIC PANEL, COMPREHENSIVE; Future    3. Mixed hyperlipidemia  Lab Results   Component Value Date/Time    Cholesterol, total 294 (H) 2022 12:00 AM    HDL Cholesterol 29 (L) 2022 12:00 AM    LDL, calculated 175 (H) 2022 12:00 AM    VLDL, calculated 90 (H) 2022 12:00 AM    Triglyceride 448 (H) 2022 12:00 AM     -he is not willing to make any dietary changes and partakes in minimal exercise, unlikely to change these habits  -recheck today, nonfasting so add ldl direct   -     LIPID PANEL; Future  -     LDL, DIRECT; Future    Follow up pending labs       SUBJECTIVE/OBJECTIVE:  Chief Complaint   Patient presents with    Follow-up    Labs     Patient is a 20-year-old male with intellectual disability, bipolar 1, recurrent major depression, schizotypal personality, lives in group home, eczema, mild intermittent asthma presenting to office for follow-up LFTs and TSH. On physical labs about 2 months ago LFTs were found high as well as Depakote and TSH was high. Subsequently normal T3-T4. Psych reduce Depakote dose and they are following up with psych Dr. María Elena Palomino for Depakote monitoring. Repeat LFTs 1 week later had improved. Hepatitis testing and iron levels were normal.    He was started on Synthroid for elevated TSH and has been tolerating his medication well. No palpitations, weight loss, tremors, night sweats. He has no abdominal pain, diarrhea, constipation, swelling in his lower extremities, sclericterus or jaundice to the skin. He has not let the staff use the triamcinolone ointment on his eczema on his hands. When it is cold he does get dry skin and some cracking on his bilateral hands, predominantly in the flexural surfaces of his fingers. States it is itchy but not painful. Staff caregiver who is with him today says that he lets them put the cream on maybe once a week and he does not moisturize his hands.  -There is no current cracking, erythema or drainage on his hands. Cholesterol was also very high 2 months ago. He is walking on the treadmill twice per week for at least 30 minutes to 1 hour. He has not had any lightheadedness, palpitations or dizziness. He has a very limited diet and is not eating a heart healthy diet. He does not eat many fruits or vegetables. Review of systems negative other than mentioned in HPI    Current Outpatient Medications on File Prior to Visit   Medication Sig Dispense Refill    cetirizine (ZYRTEC) 10 mg tablet TAKE 1 TABLET BY MOUTH DAILY 31 Tablet 11    ibuprofen (MOTRIN) 200 mg tablet Take 1 Tablet by mouth every six (6) hours as needed for Pain (fever). 30 Tablet 0    levothyroxine (SYNTHROID) 25 mcg tablet Take 1 Tablet by mouth Daily (before breakfast). 30 Tablet 1    divalproex DR (DEPAKOTE) 500 mg tablet 500 mg daily.       docusate sodium (COLACE) 100 mg capsule       One Daily Multivitamin tablet       melatonin 5 mg tablet       QUEtiapine (SEROquel) 400 mg tablet       divalproex ER (DEPAKOTE ER) 500 mg ER tablet Take 1,000 mg by mouth. At bedtime      ProAir HFA 90 mcg/actuation inhaler Take 2 Puffs by inhalation every four (4) hours as needed for Wheezing. 2 Each 3    triamcinolone acetonide (KENALOG) 0.1 % ointment Apply  to affected area two (2) times a day. use thin layer. Do not apply to cracked skin. Do no use for longer than 2 weeks 30 g 0    bacitracin zinc (BACITRACIN) ointment Apply to affected areas of cracked skin twice per day for 1 week. 15 g 0    EPINEPHrine (EPIPEN) 0.3 mg/0.3 mL injection 0.3 mg by IntraMUSCular route once as needed. No current facility-administered medications on file prior to visit. 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[facility] 11/02/2022    Intellectual disability 10/31/2022    Autism spectrum disorder 10/31/2022    Anxiety 10/31/2022    Bipolar 1 disorder ([facility] 10/31/2022    Recurrent major depressive disorder, in partial remission ([facility] 10/31/2022    Eczema 10/31/2022    Seasonal allergic rhinitis due to pollen 10/31/2022    Mild intermittent asthma without complication 79/54/1153    Food allergy 10/31/2022     Vitals:    01/30/23 1126   BP: 114/75   Pulse: 96   Resp: 18   Temp: 97.6 °F (36.4 °C)   TempSrc: Oral   SpO2: 95%   Weight: 183 lb 9.6 oz (83.3 kg)   Height: 5' 7\" (1.702 m)   PainSc:   0 - No pain        Physical Exam  Constitutional:       Appearance: Normal appearance. Eyes:      Extraocular Movements: Extraocular movements intact. Pupils: Pupils are equal, round, and reactive to light. Cardiovascular:      Rate and Rhythm: Normal rate and regular rhythm. Pulses: Normal pulses. Heart sounds: Normal heart sounds. Pulmonary:      Effort: Pulmonary effort is normal.      Breath sounds: Normal breath sounds. Abdominal:      General: Abdomen is flat. Bowel sounds are normal. There is no distension. Palpations: Abdomen is soft.  There is no hepatomegaly, splenomegaly or mass. Tenderness: There is no abdominal tenderness. Musculoskeletal:      Right lower leg: No edema. Left lower leg: No edema. Skin:     Comments: Skin on bilateral hands is dry particularly in flexural surfaces of fingers. No cracking, erythema or drainage   Neurological:      General: No focal deficit present. Mental Status: He is alert and oriented to person, place, and time. Psychiatric:         Mood and Affect: Mood normal.         Behavior: Behavior normal.       An electronic signature was used to authenticate this note.   -- Fátima Resendiz PA-C

## 2023-01-31 LAB
ALBUMIN SERPL-MCNC: 3.9 G/DL (ref 4.1–5.2)
ALBUMIN/GLOB SERPL: 1.9 {RATIO} (ref 1.2–2.2)
ALP SERPL-CCNC: 86 IU/L (ref 44–121)
ALT SERPL-CCNC: 122 IU/L (ref 0–44)
AST SERPL-CCNC: 94 IU/L (ref 0–40)
BILIRUB SERPL-MCNC: 0.3 MG/DL (ref 0–1.2)
BUN SERPL-MCNC: 4 MG/DL (ref 6–20)
BUN/CREAT SERPL: 6 (ref 9–20)
CALCIUM SERPL-MCNC: 9 MG/DL (ref 8.7–10.2)
CHLORIDE SERPL-SCNC: 104 MMOL/L (ref 96–106)
CHOLEST SERPL-MCNC: 230 MG/DL (ref 100–199)
CO2 SERPL-SCNC: 24 MMOL/L (ref 20–29)
CREAT SERPL-MCNC: 0.62 MG/DL (ref 0.76–1.27)
EGFR: 139 ML/MIN/1.73
GLOBULIN SER CALC-MCNC: 2.1 G/DL (ref 1.5–4.5)
GLUCOSE SERPL-MCNC: 90 MG/DL (ref 70–99)
HDLC SERPL-MCNC: 24 MG/DL
IMP & REVIEW OF LAB RESULTS: NORMAL
LDLC SERPL CALC-MCNC: 124 MG/DL (ref 0–99)
LDLC SERPL DIRECT ASSAY-MCNC: 141 MG/DL (ref 0–99)
POTASSIUM SERPL-SCNC: 3.9 MMOL/L (ref 3.5–5.2)
PROT SERPL-MCNC: 6 G/DL (ref 6–8.5)
SODIUM SERPL-SCNC: 143 MMOL/L (ref 134–144)
T4 FREE SERPL-MCNC: 0.99 NG/DL (ref 0.82–1.77)
TRIGL SERPL-MCNC: 457 MG/DL (ref 0–149)
TSH SERPL DL<=0.005 MIU/L-ACNC: 6.43 UIU/ML (ref 0.45–4.5)
VLDLC SERPL CALC-MCNC: 82 MG/DL (ref 5–40)

## 2023-02-01 RX ORDER — LEVOTHYROXINE SODIUM 50 UG/1
50 TABLET ORAL
Qty: 30 TABLET | Refills: 1 | Status: SHIPPED | OUTPATIENT
Start: 2023-02-01

## 2023-02-01 RX ORDER — ICOSAPENT ETHYL 1000 MG/1
2 CAPSULE ORAL 2 TIMES DAILY WITH MEALS
Qty: 120 CAPSULE | Refills: 1 | Status: SHIPPED | OUTPATIENT
Start: 2023-02-01

## 2023-02-01 NOTE — PROGRESS NOTES
Please call caregivers and advise  -labs show liver function has elevated again, reiterate no alcohol and no tylenol. I ordered an ultrasound liver- they need to call [phone] to schedule   Gretchen Lovell LPN can you also call psych Dr. Db Cordon office [phone] to verify they are rechecking depakote level and make them aware of LFT levels? -TSH is still high. I sent in increased dose of synthroid-- stop 25mcg and start 50mcg. Need to recheck in 6 weeks. Cholesterol is still way to high. Lets try a twice daily cholesterl lowing med vascepa 2 pills BID rather than statin while evaluating elevated LFTs.      Follow up 6 weeks   Elissa Brody PA-C

## 2023-02-01 NOTE — PROGRESS NOTES
Called and spoke with pt's caregiver. Pt id x 2. Notified as per Laura Walters PA-C and verbalized understanding. Attempt to contact Dr. Garcia Sis office ([phone]). Unsuccessful. LVM to return call.

## 2023-02-03 NOTE — PROGRESS NOTES
Called Dr. Woodward  office. Spoke with Margaretmary Osler. Informed her per chart review: depakote level was faxed to them on 12/8/23. She verbalized understanding and stated they didn't receive it. (Re-faxed to Dr. Woodward  office and confirmation received.) Margaretmary Osler confirmed they received the labs. Spoke with Dr. Alessandro Stevenson. He states he decreased the Depakote in December. He will order another depakote level and have the pt follow up. He states the pt's LFT levels are slightly better but he will decrease the Depakote to 500 mg at bedtime.

## 2023-02-03 NOTE — PROGRESS NOTES
Called Dr. Ayala [facility] office ([phone]). Spoke with Yao Cabrera. Informed her per Gonsalo Mcintyre PA-C: are they rechecking depakote level and informed her of the LFT levels. She verbalized understanding. She states pt had last appt on 1/27/23 but did not make a follow up appt. She requested the labs be faxed to them at [phone] without a cover sheet. Request that a note be written asking if rechecking depakote level at the top.

## 2023-02-06 ENCOUNTER — HOSPITAL ENCOUNTER (OUTPATIENT)
Dept: ULTRASOUND IMAGING | Age: 23
Discharge: HOME | End: 2023-02-06
Attending: FAMILY MEDICINE
Payer: MEDICAID

## 2023-02-06 DIAGNOSIS — R79.89 ELEVATED LFTS: ICD-10-CM

## 2023-02-06 PROCEDURE — 76705 ECHO EXAM OF ABDOMEN: CPT

## 2023-02-08 NOTE — PROGRESS NOTES
Called [facility] ([phone]). Staff whom answered the phone wasn't able to verify birthdate. States he will have another staff member call the office back for results.

## 2023-02-08 NOTE — PROGRESS NOTES
Please call caregiver and advise   -ultrasound of liver showed fatty liver disease. He needs to focus on a heart healthy diet, increase lean protein and fresh fruits and vegetables. Weight loss is the first line treatment to help prevent progression to more severe liver disease. Would they be interested in meeting with a dietician? Also, I would like him to see a liver specialist as he is young and has the elevated liver enzymes and hepatic steatosis. The elevated liver enzymes could also be due to the depakote so they need to continue working with Dr. Shayne Jay on that front. I recommend Dr. Wilmer Cameron [phone] or they can see GI   Gastrointestinal specialists: [phone]  [facility]: [phone]     Please follow up in 1 to 2 mo to recheck LFTs with us along with TSH.     Neo Landrum PA-C  Plan of care reviewed with Dr. Delgado Lob

## 2023-02-13 ENCOUNTER — TELEPHONE (OUTPATIENT)
Dept: FAMILY MEDICINE CLINIC | Age: 23
End: 2023-02-13

## 2023-02-13 DIAGNOSIS — T78.40XD ALLERGY, SUBSEQUENT ENCOUNTER: ICD-10-CM

## 2023-02-13 DIAGNOSIS — K76.0 HEPATIC STEATOSIS: Primary | ICD-10-CM

## 2023-02-13 RX ORDER — DIPHENHYDRAMINE HCL 25 MG
25 TABLET ORAL
Qty: 30 TABLET | Refills: 5 | Status: SHIPPED | OUTPATIENT
Start: 2023-02-13

## 2023-02-13 NOTE — TELEPHONE ENCOUNTER
Benadryl prn sent     Referral to dietician sent     There are no allergists in [facility], they can call whoever they would like for that to schedule appt.  Dr. Marifer Leach is an option [phone]

## 2023-02-13 NOTE — TELEPHONE ENCOUNTER
----- Message from Berna Javier PA-C sent at 2/8/2023  1:29 PM EST -----  Please call caregiver and advise   -ultrasound of liver showed fatty liver disease. He needs to focus on a heart healthy diet, increase lean protein and fresh fruits and vegetables. Weight loss is the first line treatment to help prevent progression to more severe liver disease. Would they be interested in meeting with a dietician? Also, I would like him to see a liver specialist as he is young and has the elevated liver enzymes and hepatic steatosis. The elevated liver enzymes could also be due to the depakote so they need to continue working with Dr. Loy Felty on that front. I recommend Dr. Virginia Gómez [phone] or they can see GI   Gastrointestinal specialists: [phone]  [address] [facility]: [phone]     Please follow up in 1 to 2 mo to recheck LFTs with us along with TSH.     Berna Javier PA-C  Plan of care reviewed with Dr. Estrada Shaw

## 2023-02-13 NOTE — TELEPHONE ENCOUNTER
group home  [phone] would like an RX for benadryl ( mother said he should be on benadryl ) please advise

## 2023-02-13 NOTE — TELEPHONE ENCOUNTER
Called and spoke with caregiver [facility]. -Verified pt's full name and birthdate. Notified as per Darlyn Mello PA-C about Benadryl. She verbalized understanding. Caregiver states would like a PRN Benadryl rx for pt sent to the pharmacy. Pt's mother just wants the group home to have it on hand incase he has an allergic reaction to something. Notified as per Darlyn Mello PA-C about lab results. She verbalized understanding. Caregiver states pt has a lot of food allergies and they are not exactly sure what foods the pt is allergic to. Would like a referral to an allergist.     Caregiver states yes, would like referral to dietician.

## 2023-02-14 ENCOUNTER — TELEPHONE (OUTPATIENT)
Dept: FAMILY MEDICINE CLINIC | Age: 23
End: 2023-02-14

## 2023-02-14 NOTE — TELEPHONE ENCOUNTER
----- Message from Usman Ruiz sent at 2/14/2023  1:48 PM EST -----  Subject: Message to Provider    QUESTIONS  Information for Provider? Tamanna tireney from [employer] is calling   because she states she faxed over for a PA on patient's medication on   2/1/23 and still haven't heard from provider. Please contact [employer] at   [phone].   ---------------------------------------------------------------------------  --------------  [address] [address]  [phone]; OK to leave message on voicemail  ---------------------------------------------------------------------------  --------------  SCRIPT ANSWERS  Relationship to Patient? Third Party  Third Party Type? Pharmacy? Representative Name?  [facility] [employer]

## 2023-02-15 NOTE — TELEPHONE ENCOUNTER
Called and spoke with pt's caregiver Alie Love). -Verified pt's full name and birthdate. Notified as per Kayleigh Rios PA-C and verbalized understanding. Provided # to Dietitian: Rudolph Salvatore [phone]. She verbalized understanding. Pt's caregiver states she's tried contacting Dr. Gini Ellington, Gastrointestinal Specialist and [facility] but none of them can get him in until October or November. She states they said if the physician speaks with them (physician to physician) explaining the severity they may be able to get him in sooner. She would like to know if Holley Scheuermann will call one of the physicians to explain the condition to get him in sooner. Or does Holley Scheuermann want her to try and get him seen somewhere else.

## 2023-02-16 NOTE — TELEPHONE ENCOUNTER
Yes most liver specialists have a long wait time. Eugene's referral is not urgent at this time. I recommend they call back periodically to see if there is a cancellation otherwise take the appt in October they have scheduled and Please follow up with us in 1 to 2 mo to recheck LFTs along with TSH as previously advised!  If anything worsens with his liver function in the future we can facilitate quicker referral if warranted   Denise Jones PA-C

## 2023-02-16 NOTE — TELEPHONE ENCOUNTER
Spoke with pt's caregiver Kenya Jarvis. Notified as per Giulia Rodriguez PA-C and verbalized understanding.

## 2023-02-22 ENCOUNTER — TELEPHONE (OUTPATIENT)
Dept: FAMILY MEDICINE CLINIC | Age: 23
End: 2023-02-22

## 2023-02-22 NOTE — TELEPHONE ENCOUNTER
Patients mother called and would lime to have a copy of the labs that showed he has a fatty liver.  Please fax to her at [phone]

## 2023-02-23 NOTE — TELEPHONE ENCOUNTER
Per Practice Manager Giovana Valenzuela: we are not able to fax patient information to personal fax machines. We can print it and leave it at the  for  or mail it to the address on file. Attempt to contact pt's mother. Unsuccessful. Home # listed is to group home pt lives in. Mobile # is group home caregiver. Pt's mother did not leave her contact #.

## 2023-02-24 ENCOUNTER — TELEPHONE (OUTPATIENT)
Dept: FAMILY MEDICINE CLINIC | Age: 23
End: 2023-02-24

## 2023-02-24 NOTE — TELEPHONE ENCOUNTER
[facility] caregiver asking if patient can be referred to an allergist. Also stated patient was referred to the liver specialists and are unable to be seen until October and would like to know if we can contact office or is there another office we can suggest.     Shelby Leonardo #[phone]

## 2023-02-24 NOTE — TELEPHONE ENCOUNTER
My advice has not changed in this regard as we told them a week or so ago     -3 liver specialist they can call and may have to keep calling to see if there are sooner appts are Dr. Macie Zhu, [facility]: [phone], [facility]: [phone]. They all have long waits, his referral is not urgent and he needs to follow up with us in 1 to 2 mo to recheck LFTs and if they worsen or symptoms change then we can try to facilitate earlier referral if indicated. In regards to allergist, they can call whichever allergist in the area they prefer as we previously advised.  If they need formal insurance authorized referral then an appt is needed with us first.   Major Palmer PA-C

## 2023-02-27 NOTE — TELEPHONE ENCOUNTER
Called and spoke with pt's caregiver [facility]). Pt id x [id number]. Relayed the previous message per Safia Avila PA-C. She verbalized understanding.

## 2023-03-17 DIAGNOSIS — L30.8 OTHER ECZEMA: ICD-10-CM

## 2023-03-20 RX ORDER — TRIAMCINOLONE ACETONIDE 1 MG/G
OINTMENT TOPICAL
Qty: 30 G | Refills: 0 | Status: SHIPPED | OUTPATIENT
Start: 2023-03-20

## 2023-03-27 ENCOUNTER — TELEPHONE (OUTPATIENT)
Dept: FAMILY MEDICINE CLINIC | Age: 23
End: 2023-03-27

## 2023-03-27 NOTE — TELEPHONE ENCOUNTER
----- Message from Junior Handley sent at 3/27/2023 11:27 AM EDT -----  Subject: Message to Provider    QUESTIONS  Information for Provider? Elpidio Paul from [address] [facility] [employer]   faxed to [phone]. Any questions call [phone] or [phone]  ---------------------------------------------------------------------------  --------------  [place] [employer]  [phone]; Do not leave any message, patient will call back for answer  ---------------------------------------------------------------------------  --------------  SCRIPT ANSWERS  Relationship to Patient? Third Party  Third Party Type? Other  Other Third Party Type? [facility] [employer] [facility]  Representative Name?  Elpidio Paul

## 2023-03-30 ENCOUNTER — DOCUMENTATION ONLY (OUTPATIENT)
Dept: FAMILY MEDICINE CLINIC | Age: 23
End: 2023-03-30

## 2023-04-18 DIAGNOSIS — E03.9 ACQUIRED HYPOTHYROIDISM: ICD-10-CM

## 2023-04-18 RX ORDER — LEVOTHYROXINE SODIUM 50 UG/1
50 TABLET ORAL
Qty: 30 TABLET | Refills: 1 | Status: SHIPPED | OUTPATIENT
Start: 2023-04-18

## 2023-05-16 ENCOUNTER — TELEPHONE (OUTPATIENT)
Age: 23
End: 2023-05-16

## 2023-05-18 RX ORDER — ICOSAPENT ETHYL 1000 MG/1
CAPSULE ORAL
Qty: 124 CAPSULE | Refills: 11 | Status: SHIPPED | OUTPATIENT
Start: 2023-05-18

## 2023-07-27 RX ORDER — MULTIVITAMIN
TABLET ORAL
Qty: 31 TABLET | Refills: 12 | Status: SHIPPED | OUTPATIENT
Start: 2023-07-27

## 2023-08-24 RX ORDER — DOCUSATE SODIUM 100 MG/1
CAPSULE, LIQUID FILLED ORAL
Qty: 31 CAPSULE | Refills: 12 | Status: SHIPPED | OUTPATIENT
Start: 2023-08-24

## 2023-10-30 ENCOUNTER — OFFICE VISIT (OUTPATIENT)
Age: 23
End: 2023-10-30
Payer: MEDICAID

## 2023-10-30 VITALS
BODY MASS INDEX: 26.37 KG/M2 | DIASTOLIC BLOOD PRESSURE: 83 MMHG | HEART RATE: 110 BPM | WEIGHT: 168 LBS | HEIGHT: 67 IN | SYSTOLIC BLOOD PRESSURE: 118 MMHG | TEMPERATURE: 98.2 F | OXYGEN SATURATION: 97 %

## 2023-10-30 DIAGNOSIS — R74.8 ELEVATED LIVER ENZYMES: Primary | ICD-10-CM

## 2023-10-30 PROCEDURE — 99203 OFFICE O/P NEW LOW 30 MIN: CPT | Performed by: NURSE PRACTITIONER

## 2023-10-30 ASSESSMENT — PATIENT HEALTH QUESTIONNAIRE - PHQ9
SUM OF ALL RESPONSES TO PHQ QUESTIONS 1-9: 0
SUM OF ALL RESPONSES TO PHQ QUESTIONS 1-9: 0
2. FEELING DOWN, DEPRESSED OR HOPELESS: 0
SUM OF ALL RESPONSES TO PHQ QUESTIONS 1-9: 0
1. LITTLE INTEREST OR PLEASURE IN DOING THINGS: 0
SUM OF ALL RESPONSES TO PHQ9 QUESTIONS 1 & 2: 0
SUM OF ALL RESPONSES TO PHQ QUESTIONS 1-9: 0

## 2023-11-13 LAB
BASOPHILS # BLD AUTO: 0 X10E3/UL (ref 0–0.2)
BASOPHILS NFR BLD AUTO: 1 %
EOSINOPHIL # BLD AUTO: 0.7 X10E3/UL (ref 0–0.4)
EOSINOPHIL NFR BLD AUTO: 14 %
ERYTHROCYTE [DISTWIDTH] IN BLOOD BY AUTOMATED COUNT: 13.1 % (ref 11.6–15.4)
HCT VFR BLD AUTO: 50.3 % (ref 37.5–51)
HGB BLD-MCNC: 17.1 G/DL (ref 13–17.7)
IMM GRANULOCYTES # BLD AUTO: 0 X10E3/UL (ref 0–0.1)
IMM GRANULOCYTES NFR BLD AUTO: 0 %
LYMPHOCYTES # BLD AUTO: 2.1 X10E3/UL (ref 0.7–3.1)
LYMPHOCYTES NFR BLD AUTO: 41 %
MCH RBC QN AUTO: 31.1 PG (ref 26.6–33)
MCHC RBC AUTO-ENTMCNC: 34 G/DL (ref 31.5–35.7)
MCV RBC AUTO: 92 FL (ref 79–97)
MONOCYTES # BLD AUTO: 0.4 X10E3/UL (ref 0.1–0.9)
MONOCYTES NFR BLD AUTO: 9 %
NEUTROPHILS # BLD AUTO: 1.8 X10E3/UL (ref 1.4–7)
NEUTROPHILS NFR BLD AUTO: 35 %
PLATELET # BLD AUTO: 258 X10E3/UL (ref 150–450)
RBC # BLD AUTO: 5.5 X10E6/UL (ref 4.14–5.8)
WBC # BLD AUTO: 5.1 X10E3/UL (ref 3.4–10.8)

## 2023-11-14 LAB
A1AT SERPL-MCNC: 113 MG/DL (ref 95–164)
A2 MACROGLOB SERPL-MCNC: 152 MG/DL (ref 110–276)
ALBUMIN SERPL-MCNC: 4.9 G/DL (ref 4.3–5.2)
ALP SERPL-CCNC: 79 IU/L (ref 44–121)
ALT SERPL W P-5'-P-CCNC: 37 IU/L (ref 0–55)
ALT SERPL-CCNC: 32 IU/L (ref 0–44)
APO A-I SERPL-MCNC: 123 MG/DL (ref 101–178)
AST SERPL W P-5'-P-CCNC: 29 IU/L (ref 0–40)
AST SERPL-CCNC: 25 IU/L (ref 0–40)
BILIRUB DIRECT SERPL-MCNC: 0.12 MG/DL (ref 0–0.4)
BILIRUB SERPL-MCNC: 0.3 MG/DL (ref 0–1.2)
BILIRUB SERPL-MCNC: 0.4 MG/DL (ref 0–1.2)
BUN SERPL-MCNC: 8 MG/DL (ref 6–20)
BUN/CREAT SERPL: 10 (ref 9–20)
CALCIUM SERPL-MCNC: 9.4 MG/DL (ref 8.7–10.2)
CERULOPLASMIN SERPL-MCNC: 23.8 MG/DL (ref 16–31)
CHLORIDE SERPL-SCNC: 103 MMOL/L (ref 96–106)
CHOLEST SERPL-MCNC: 263 MG/DL (ref 100–199)
CO2 SERPL-SCNC: 24 MMOL/L (ref 20–29)
CREAT SERPL-MCNC: 0.84 MG/DL (ref 0.76–1.27)
EGFRCR SERPLBLD CKD-EPI 2021: 126 ML/MIN/1.73
FIBROSIS SCORING:: ABNORMAL
FIBROSIS STAGE SERPL QL: ABNORMAL
GGT SERPL-CCNC: 28 IU/L (ref 0–65)
GLUCOSE SERPL-MCNC: 87 MG/DL (ref 70–99)
GLUCOSE SERPL-MCNC: 94 MG/DL (ref 70–99)
HAPTOGLOB SERPL-MCNC: 161 MG/DL (ref 17–317)
HAV AB SER QL IA: POSITIVE
HCV GENTYP SERPL NAA+PROBE: NORMAL
HCV RNA SERPL NAA+PROBE-ACNC: NORMAL IU/ML
HCV RNA SERPL NAA+PROBE-LOG IU: NORMAL LOG10 IU/ML
IRON SATN MFR SERPL: 23 % (ref 15–55)
IRON SERPL-MCNC: 75 UG/DL (ref 38–169)
LABORATORY COMMENT REPORT: ABNORMAL
LABORATORY COMMENT REPORT: NORMAL
LIVER FIBR SCORE SERPL CALC.FIBROSURE: 0.06 (ref 0–0.21)
LIVER STEATOSIS GRADE SERPL QL: ABNORMAL
LIVER STEATOSIS SCORE SERPL: 0.47 (ref 0–0.4)
MITOCHONDRIA M2 IGG SER-ACNC: <20 UNITS (ref 0–20)
NASH GRADE SERPL QL: ABNORMAL
NASH INTERPRETATION SERPL-IMP: ABNORMAL
NASH SCORE SERPL: 0.23 (ref 0–0.25)
NASH SCORING: ABNORMAL
POTASSIUM SERPL-SCNC: 4.2 MMOL/L (ref 3.5–5.2)
PROT SERPL-MCNC: 7.4 G/DL (ref 6–8.5)
SMA IGG SER-ACNC: 5 UNITS (ref 0–19)
SODIUM SERPL-SCNC: 144 MMOL/L (ref 134–144)
STEATOSIS SCORING: ABNORMAL
TEST PERFORMANCE INFO SPEC: ABNORMAL
TEST PERFORMANCE INFO SPEC: ABNORMAL
TIBC SERPL-MCNC: 330 UG/DL (ref 250–450)
TRIGL SERPL-MCNC: 265 MG/DL (ref 0–149)
UIBC SERPL-MCNC: 255 UG/DL (ref 111–343)

## 2023-11-16 LAB
ANA SER QL IF: POSITIVE
ANA SPECKLED TITR SER: ABNORMAL {TITER}
LABORATORY COMMENT REPORT: ABNORMAL

## 2024-01-19 ENCOUNTER — TELEPHONE (OUTPATIENT)
Age: 24
End: 2024-01-19

## 2024-01-19 RX ORDER — CETIRIZINE HYDROCHLORIDE 10 MG/1
10 TABLET ORAL DAILY
Qty: 30 TABLET | Refills: 0 | Status: SHIPPED | OUTPATIENT
Start: 2024-01-19

## 2024-01-19 NOTE — TELEPHONE ENCOUNTER
We received a fax refill request for Scotty Rodríguez.  Please escribe Cetirizine HCL to their pharmacy.  The pharmacy is correct in the chart and they are requesting a 31 day supply.      Left  @ [phone] /[facility] to call office and set up an appointment for a medication refill

## 2024-02-12 RX ORDER — CETIRIZINE HYDROCHLORIDE 10 MG/1
10 TABLET ORAL DAILY
Qty: 31 TABLET | Refills: 0 | Status: SHIPPED | OUTPATIENT
Start: 2024-02-12